# Patient Record
Sex: MALE | Race: WHITE | URBAN - METROPOLITAN AREA
[De-identification: names, ages, dates, MRNs, and addresses within clinical notes are randomized per-mention and may not be internally consistent; named-entity substitution may affect disease eponyms.]

---

## 2019-10-07 ENCOUNTER — Encounter (OUTPATIENT)
Dept: URBAN - METROPOLITAN AREA CLINIC 75 | Facility: CLINIC | Age: 69
End: 2019-10-07
Payer: MEDICARE

## 2019-10-07 PROCEDURE — 92133 CPTRZD OPH DX IMG PST SGM ON: CPT | Performed by: OPTOMETRIST

## 2019-10-07 PROCEDURE — 99214 OFFICE O/P EST MOD 30 MIN: CPT | Performed by: OPTOMETRIST

## 2020-08-06 ENCOUNTER — OFFICE VISIT (OUTPATIENT)
Dept: URBAN - METROPOLITAN AREA CLINIC 75 | Facility: CLINIC | Age: 70
End: 2020-08-06
Payer: MEDICARE

## 2020-08-06 DIAGNOSIS — H40.013 OPEN ANGLE WITH BORDERLINE FINDINGS, LOW RISK, BILATERAL: Primary | ICD-10-CM

## 2020-08-06 DIAGNOSIS — H26.493 OTHER SECONDARY CATARACT, BILATERAL: ICD-10-CM

## 2020-08-06 DIAGNOSIS — H43.813 VITREOUS DEGENERATION, BILATERAL: ICD-10-CM

## 2020-08-06 PROCEDURE — 92014 COMPRE OPH EXAM EST PT 1/>: CPT | Performed by: OPTOMETRIST

## 2020-08-06 PROCEDURE — 92133 CPTRZD OPH DX IMG PST SGM ON: CPT | Performed by: OPTOMETRIST

## 2020-08-06 PROCEDURE — 92134 CPTRZ OPH DX IMG PST SGM RTA: CPT | Performed by: OPTOMETRIST

## 2020-08-06 PROCEDURE — 76514 ECHO EXAM OF EYE THICKNESS: CPT | Performed by: OPTOMETRIST

## 2020-08-06 ASSESSMENT — INTRAOCULAR PRESSURE
OS: 7
OD: 8

## 2020-08-06 ASSESSMENT — VISUAL ACUITY
OS: 20/30
OD: 20/50

## 2020-08-06 NOTE — IMPRESSION/PLAN
Impression: Open angle with borderline findings, low risk, bilateral: H40.013. Plan: Glaucoma suspects do not usually need to be treated but do need close follow up monitoring for early signs of glaucoma damage. Some glaucoma suspects require treatment, and the same medical options of glaucoma eye drops and oral medications are utilized. Laser procedures and intraocular surgery are usually reserved for patients with uncontrolled glaucoma. Contact office if any loss of vision, eye pain, cloudy vision, photophobia, or dark spots or clouds blocking portions of your vision occurs. Pt has thick corneas, and IOP is under control. Pt has been monitored by Dr. Arnoldo Sharpe due to pale optic nerves from sleep apnea.   Will continue to monitor

## 2021-08-16 ENCOUNTER — OFFICE VISIT (OUTPATIENT)
Dept: URBAN - METROPOLITAN AREA CLINIC 75 | Facility: CLINIC | Age: 71
End: 2021-08-16
Payer: MEDICARE

## 2021-08-16 DIAGNOSIS — Z96.1 PRESENCE OF INTRAOCULAR LENS: ICD-10-CM

## 2021-08-16 PROCEDURE — 92014 COMPRE OPH EXAM EST PT 1/>: CPT | Performed by: OPTOMETRIST

## 2021-08-16 PROCEDURE — 92134 CPTRZ OPH DX IMG PST SGM RTA: CPT | Performed by: OPTOMETRIST

## 2021-08-16 PROCEDURE — 92133 CPTRZD OPH DX IMG PST SGM ON: CPT | Performed by: OPTOMETRIST

## 2021-08-16 ASSESSMENT — INTRAOCULAR PRESSURE
OS: 8
OD: 10

## 2021-08-16 NOTE — IMPRESSION/PLAN
Impression: Open angle with borderline findings, low risk, bilateral: H40.013- OCT performed today shows thinning OS. IOP is normal. Clinical exam findings discussed with pt. Plan: Will continue to monitor appearance ans health of nerves.

## 2021-12-17 ENCOUNTER — OFFICE VISIT (OUTPATIENT)
Dept: URBAN - METROPOLITAN AREA CLINIC 75 | Facility: CLINIC | Age: 71
End: 2021-12-17
Payer: MEDICARE

## 2021-12-17 DIAGNOSIS — H52.223 REGULAR ASTIGMATISM, BILATERAL: ICD-10-CM

## 2021-12-17 DIAGNOSIS — H52.4 PRESBYOPIA: ICD-10-CM

## 2021-12-17 DIAGNOSIS — H17.9 CORNEAL SCAR: Primary | ICD-10-CM

## 2021-12-17 PROCEDURE — 99213 OFFICE O/P EST LOW 20 MIN: CPT | Performed by: OPTOMETRIST

## 2021-12-17 ASSESSMENT — INTRAOCULAR PRESSURE
OS: 7
OD: 10

## 2021-12-17 ASSESSMENT — VISUAL ACUITY
OS: 20/25
OD: 20/200

## 2021-12-17 NOTE — IMPRESSION/PLAN
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 Plan: OD: Discussed diagnosis in detail with patient. discussed the scar central is likely the cause of his decreased vision. There is no retinal findings to warrant the decreased vision. Suspect his vision will fluctuate with good and bad days depending on where through the scar he is looking. Discussed the option of a referral to a corneal specialist for an evaluation for a corneal transplant.

## 2021-12-17 NOTE — IMPRESSION/PLAN
Impression: Regular astigmatism, bilateral: H52.223. Plan: Discussed holding on the glasses until after his evaluation with the corneal specialist. If no transplant is recommended ok to fill the new glasses RX.

## 2022-05-04 ENCOUNTER — OFFICE VISIT (OUTPATIENT)
Dept: URBAN - METROPOLITAN AREA CLINIC 75 | Facility: CLINIC | Age: 72
End: 2022-05-04
Payer: MEDICARE

## 2022-05-04 DIAGNOSIS — H17.9 CORNEAL SCAR: Primary | ICD-10-CM

## 2022-05-04 PROCEDURE — 99213 OFFICE O/P EST LOW 20 MIN: CPT | Performed by: OPTOMETRIST

## 2022-05-04 ASSESSMENT — INTRAOCULAR PRESSURE
OS: 7
OD: 9

## 2022-05-04 NOTE — IMPRESSION/PLAN
Impression: Corneal scar: H17.9 Right. Plan: Discussed option of Scleral lens and educated pt on price. Pt elects to try Scleral lens. If lens fails will re-consult Dr. Tg Escalona for transplant.

## 2022-05-25 ENCOUNTER — OFFICE VISIT (OUTPATIENT)
Dept: URBAN - METROPOLITAN AREA CLINIC 75 | Facility: CLINIC | Age: 72
End: 2022-05-25
Payer: MEDICARE

## 2022-05-25 DIAGNOSIS — H52.4 PRESBYOPIA: ICD-10-CM

## 2022-05-25 DIAGNOSIS — H52.223 REGULAR ASTIGMATISM, BILATERAL: ICD-10-CM

## 2022-05-25 PROCEDURE — 92310 CONTACT LENS FITTING OU: CPT | Performed by: OPTOMETRIST

## 2022-05-25 ASSESSMENT — INTRAOCULAR PRESSURE
OD: 9
OS: 7

## 2022-05-25 NOTE — IMPRESSION/PLAN
Impression: Corneal scar: H17.9 Right. SA PWR: -1.50 CHANEL:15.6 MID/WILCOX: STD EDG:+75/-75 CT: 0.24
OR +0.25 20/150 -1 FIT : [employer] +0.25/-1.25  Plan: Discussed care, application, and removal. 
Care pamphlet given in office. Pt to be contact for dispense appt when lens arrive.

## 2022-06-20 ENCOUNTER — OFFICE VISIT (OUTPATIENT)
Dept: URBAN - METROPOLITAN AREA CLINIC 75 | Facility: CLINIC | Age: 72
End: 2022-06-20
Payer: MEDICARE

## 2022-06-20 DIAGNOSIS — H17.9 CORNEAL SCAR: ICD-10-CM

## 2022-06-20 DIAGNOSIS — H43.811 VITREOUS DEGENERATION, RIGHT EYE: ICD-10-CM

## 2022-06-20 DIAGNOSIS — H35.033 HYPERTENSIVE RETINOPATHY, BILATERAL: ICD-10-CM

## 2022-06-20 DIAGNOSIS — H26.491 OTHER SECONDARY CATARACT, RIGHT EYE: Primary | ICD-10-CM

## 2022-06-20 PROCEDURE — 99214 OFFICE O/P EST MOD 30 MIN: CPT | Performed by: OPTOMETRIST

## 2022-06-20 ASSESSMENT — INTRAOCULAR PRESSURE
OD: 9
OS: 9

## 2022-06-20 NOTE — IMPRESSION/PLAN
Impression: Other secondary cataract, right eye: H26.491. Plan: Posterior capsular opacification (PCO) often needs a YAG laser posterior capsulotomy to remove the opacity and improve vision. Treatment recommended at this time.  Pt elects YAG OD.

## 2022-07-07 ENCOUNTER — OFFICE VISIT (OUTPATIENT)
Dept: URBAN - METROPOLITAN AREA CLINIC 75 | Facility: CLINIC | Age: 72
End: 2022-07-07

## 2022-07-07 DIAGNOSIS — H52.4 PRESBYOPIA: ICD-10-CM

## 2022-07-07 DIAGNOSIS — H17.9 CORNEAL SCAR: Primary | ICD-10-CM

## 2022-07-07 PROCEDURE — 92310 CONTACT LENS FITTING OU: CPT | Performed by: OPTOMETRIST

## 2022-07-07 NOTE — IMPRESSION/PLAN
Impression: Corneal scar: H17.9 Right. Fit; Full Coverage - OR [place] 20/200 Plan: Discussed care, application, and removal. 
Care pamphlet given in office. Ok to dispense lens today. Pt to return for 1m R/C after 4 hours of wear, preferably in the afternoon. Contact office with any changes or concerns.

## 2022-08-24 ENCOUNTER — OFFICE VISIT (OUTPATIENT)
Dept: URBAN - METROPOLITAN AREA CLINIC 75 | Facility: CLINIC | Age: 72
End: 2022-08-24

## 2022-08-24 DIAGNOSIS — H17.9 CORNEAL SCAR: Primary | ICD-10-CM

## 2022-08-24 DIAGNOSIS — H52.4 PRESBYOPIA: ICD-10-CM

## 2022-08-24 PROCEDURE — 92310 CONTACT LENS FITTING OU: CPT | Performed by: OPTOMETRIST

## 2022-08-24 NOTE — IMPRESSION/PLAN
Impression: Corneal scar: H17.9 Right. Fit: Full Coverage - OR [place] 20/200 Plan: Discussed care, application, and removal. 
Patient to return for more application/removal training with Hiram Varghese when back in the office Ok to dispense lens today. Contact office with any changes or concerns.

## 2022-08-26 ENCOUNTER — OFFICE VISIT (OUTPATIENT)
Dept: URBAN - METROPOLITAN AREA CLINIC 75 | Facility: CLINIC | Age: 72
End: 2022-08-26
Payer: MEDICARE

## 2022-08-26 DIAGNOSIS — S05.01XA CORNEAL ABRASION W/O FB OF RIGHT EYE, INITIAL ENCOUNTER: Primary | ICD-10-CM

## 2022-08-26 PROCEDURE — 99213 OFFICE O/P EST LOW 20 MIN: CPT | Performed by: OPTOMETRIST

## 2022-08-26 RX ORDER — NEOMYCIN SULFATE, POLYMYXIN B SULFATE AND DEXAMETHASONE 3.5; 10000; 1 MG/ML; [USP'U]/ML; MG/ML
SUSPENSION/ DROPS OPHTHALMIC
Qty: 5 | Refills: 0 | Status: ACTIVE
Start: 2022-08-26

## 2022-08-26 NOTE — IMPRESSION/PLAN
Impression: Corneal abrasion w/o FB of right eye, initial encounter: S05.01xA. No lens found OD - Abrasion Plan: Discussed pt scratched conjunctival trying to remove lens that was not in eye. Pt to begin Neopolydex TID for 5 days OD. Clear to continue as scheduled YAG OD. Contact office with any changes or concerns.

## 2022-08-29 ENCOUNTER — SURGERY (OUTPATIENT)
Dept: URBAN - METROPOLITAN AREA SURGERY 44 | Facility: SURGERY | Age: 72
End: 2022-08-29
Payer: MEDICARE

## 2022-08-29 ENCOUNTER — SURGERY (OUTPATIENT)
Dept: URBAN - METROPOLITAN AREA SURGERY 45 | Facility: SURGERY | Age: 72
End: 2022-08-29
Payer: MEDICARE

## 2022-08-29 PROCEDURE — 66821 AFTER CATARACT LASER SURGERY: CPT | Performed by: OPHTHALMOLOGY

## 2022-09-08 ENCOUNTER — POST-OPERATIVE VISIT (OUTPATIENT)
Dept: URBAN - METROPOLITAN AREA CLINIC 75 | Facility: CLINIC | Age: 72
End: 2022-09-08
Payer: MEDICARE

## 2022-09-08 DIAGNOSIS — Z48.810 ENCOUNTER FOR SURGICAL AFTERCARE FOLLOWING SURGERY ON A SENSE ORGAN: Primary | ICD-10-CM

## 2022-09-08 PROCEDURE — 99024 POSTOP FOLLOW-UP VISIT: CPT | Performed by: OPTOMETRIST

## 2022-09-08 ASSESSMENT — INTRAOCULAR PRESSURE
OS: 5
OD: 9

## 2022-09-08 NOTE — IMPRESSION/PLAN
Impression:  Encounter for surgical aftercare following surgery on a sense organ  Z48.810. Plan: Discussed pt distance vision is stable w/o glasses and OTC readers recommended to assist with any near difficulties. Contact office with any concerns.

## 2022-09-13 ENCOUNTER — OFFICE VISIT (OUTPATIENT)
Dept: URBAN - METROPOLITAN AREA CLINIC 75 | Facility: CLINIC | Age: 72
End: 2022-09-13
Payer: MEDICARE

## 2022-09-13 DIAGNOSIS — H17.89 OTHER CORNEAL SCARS AND OPACITIES: ICD-10-CM

## 2022-09-13 DIAGNOSIS — H26.492 OTHER SECONDARY CATARACT, LEFT EYE: ICD-10-CM

## 2022-09-13 DIAGNOSIS — H43.813 VITREOUS DEGENERATION, BILATERAL: ICD-10-CM

## 2022-09-13 DIAGNOSIS — H40.013 OPEN ANGLE WITH BORDERLINE FINDINGS, LOW RISK, BILATERAL: Primary | ICD-10-CM

## 2022-09-13 PROCEDURE — 99214 OFFICE O/P EST MOD 30 MIN: CPT | Performed by: OPTOMETRIST

## 2022-09-13 PROCEDURE — 92133 CPTRZD OPH DX IMG PST SGM ON: CPT | Performed by: OPTOMETRIST

## 2022-09-13 PROCEDURE — 92134 CPTRZ OPH DX IMG PST SGM RTA: CPT | Performed by: OPTOMETRIST

## 2022-09-13 ASSESSMENT — INTRAOCULAR PRESSURE
OS: 7
OD: 8

## 2022-09-13 ASSESSMENT — VISUAL ACUITY: OS: 20/40

## 2022-09-13 NOTE — IMPRESSION/PLAN
Impression: Open angle with borderline findings, low risk, bilateral: H40.013. Low Tension suspicion based on cupping OCT ordered and performed Plan: Glaucoma suspects do not usually need to be treated but do need close follow up monitoring for early signs of glaucoma damage. Some glaucoma suspects require treatment, and the same medical options of glaucoma eye drops and oral medications are utilized. Laser procedures and intraocular surgery are usually reserved for patients with uncontrolled glaucoma. Contact office if any loss of vision, eye pain, cloudy vision, photophobia, or dark spots or clouds blocking portions of your vision occurs.

## 2022-09-13 NOTE — IMPRESSION/PLAN
Impression: Other secondary cataract, left eye: H26.492. Plan: Posterior capsular opacification (PCO) often needs a YAG laser posterior capsulotomy to remove the opacity and improve vision. PCO occurs in about 20% of all cataract surgery cases, months to years later. There is no way to prevent its occurance. It is due to remaining lens epithelial cells that proliferate and coat the clear posterior capsule. Treatment recommended at this time. John Zabala Dr
Patient to contact office with changes in vision or concerns.

## 2022-09-20 ENCOUNTER — OFFICE VISIT (OUTPATIENT)
Dept: URBAN - METROPOLITAN AREA CLINIC 75 | Facility: CLINIC | Age: 72
End: 2022-09-20

## 2022-09-20 DIAGNOSIS — H52.4 PRESBYOPIA: Primary | ICD-10-CM

## 2022-09-20 PROCEDURE — 92310 CONTACT LENS FITTING OU: CPT | Performed by: OPTOMETRIST

## 2022-09-20 ASSESSMENT — INTRAOCULAR PRESSURE
OD: 9
OS: 6

## 2022-09-20 NOTE — IMPRESSION/PLAN
Impression: Presbyopia: H52.4. Plan: scleral lens in OD. Good fit and coverage.  Reviewed insertion of the lens with the PT.

## 2022-10-10 ENCOUNTER — SURGERY (OUTPATIENT)
Dept: URBAN - METROPOLITAN AREA SURGERY 44 | Facility: SURGERY | Age: 72
End: 2022-10-10
Payer: MEDICARE

## 2022-10-10 ENCOUNTER — SURGERY (OUTPATIENT)
Dept: URBAN - METROPOLITAN AREA SURGERY 45 | Facility: SURGERY | Age: 72
End: 2022-10-10
Payer: MEDICARE

## 2022-10-10 PROCEDURE — 66821 AFTER CATARACT LASER SURGERY: CPT | Performed by: OPHTHALMOLOGY

## 2022-10-18 ENCOUNTER — POST-OPERATIVE VISIT (OUTPATIENT)
Dept: URBAN - METROPOLITAN AREA CLINIC 75 | Facility: CLINIC | Age: 72
End: 2022-10-18
Payer: MEDICARE

## 2022-10-18 DIAGNOSIS — Z48.810 ENCOUNTER FOR SURGICAL AFTERCARE FOLLOWING SURGERY ON A SENSE ORGAN: Primary | ICD-10-CM

## 2022-10-18 PROCEDURE — 99024 POSTOP FOLLOW-UP VISIT: CPT | Performed by: OPTOMETRIST

## 2022-10-18 ASSESSMENT — INTRAOCULAR PRESSURE
OD: 7
OS: 6

## 2022-10-18 NOTE — IMPRESSION/PLAN
Impression:  Encounter for surgical aftercare following surgery on a sense organ  Z48.810. Excellent post op course   Condition is improving. Plan: Discussed. Stable with good results. 
Pt to continue as scheduled with yearly dilation

## 2023-04-19 ENCOUNTER — OFFICE VISIT (OUTPATIENT)
Dept: URBAN - METROPOLITAN AREA CLINIC 71 | Facility: CLINIC | Age: 73
End: 2023-04-19
Payer: COMMERCIAL

## 2023-04-19 DIAGNOSIS — Z96.1 PRESENCE OF INTRAOCULAR LENS: ICD-10-CM

## 2023-04-19 DIAGNOSIS — H17.89 OTHER CORNEAL SCARS AND OPACITIES: ICD-10-CM

## 2023-04-19 DIAGNOSIS — H52.4 PRESBYOPIA: Primary | ICD-10-CM

## 2023-04-19 DIAGNOSIS — H43.813 VITREOUS DEGENERATION, BILATERAL: ICD-10-CM

## 2023-04-19 PROCEDURE — 92014 COMPRE OPH EXAM EST PT 1/>: CPT | Performed by: OPTOMETRIST

## 2023-04-19 ASSESSMENT — VISUAL ACUITY
OD: 20/40
OS: 20/20

## 2023-04-19 ASSESSMENT — INTRAOCULAR PRESSURE
OD: 7
OS: 8

## 2023-04-19 NOTE — IMPRESSION/PLAN
Impression: Vitreous degeneration, bilateral: H43.813. PVD stable OU.  Plan: Continue to monitor with yearly DE.

## 2023-04-19 NOTE — IMPRESSION/PLAN
Impression: Other corneal scars and opacities: H17.89. Right. Pt tried scleral lens, but was unable to tolerate. Plan: Continue to observe.

## 2024-04-22 ENCOUNTER — OFFICE VISIT (OUTPATIENT)
Dept: URBAN - METROPOLITAN AREA CLINIC 71 | Facility: CLINIC | Age: 74
End: 2024-04-22
Payer: MEDICARE

## 2024-04-22 DIAGNOSIS — H43.813 VITREOUS DEGENERATION, BILATERAL: Primary | ICD-10-CM

## 2024-04-22 DIAGNOSIS — H52.4 PRESBYOPIA: ICD-10-CM

## 2024-04-22 DIAGNOSIS — H17.89 OTHER CORNEAL SCARS AND OPACITIES: ICD-10-CM

## 2024-04-22 DIAGNOSIS — Z96.1 PRESENCE OF INTRAOCULAR LENS: ICD-10-CM

## 2024-04-22 PROCEDURE — 92014 COMPRE OPH EXAM EST PT 1/>: CPT | Performed by: OPTOMETRIST

## 2024-04-22 ASSESSMENT — INTRAOCULAR PRESSURE
OS: 11
OD: 9

## 2024-04-22 ASSESSMENT — VISUAL ACUITY
OD: 20/50
OS: 20/20